# Patient Record
Sex: MALE | Race: WHITE | URBAN - METROPOLITAN AREA
[De-identification: names, ages, dates, MRNs, and addresses within clinical notes are randomized per-mention and may not be internally consistent; named-entity substitution may affect disease eponyms.]

---

## 2020-08-10 ENCOUNTER — TELEPHONE ENCOUNTER (OUTPATIENT)
Dept: URBAN - METROPOLITAN AREA CLINIC 40 | Facility: CLINIC | Age: 25
End: 2020-08-10

## 2020-08-12 ENCOUNTER — TELEPHONE ENCOUNTER (OUTPATIENT)
Dept: URBAN - METROPOLITAN AREA CLINIC 40 | Facility: CLINIC | Age: 25
End: 2020-08-12

## 2021-09-28 ENCOUNTER — OFFICE VISIT (OUTPATIENT)
Dept: URBAN - METROPOLITAN AREA TELEHEALTH 2 | Facility: TELEHEALTH | Age: 26
End: 2021-09-28
Payer: COMMERCIAL

## 2021-09-28 DIAGNOSIS — R10.84 ABDOMINAL CRAMPING, GENERALIZED: ICD-10-CM

## 2021-09-28 DIAGNOSIS — E55.9 VITAMIN D DEFICIENCY: ICD-10-CM

## 2021-09-28 DIAGNOSIS — K20.90 ESOPHAGITIS: ICD-10-CM

## 2021-09-28 DIAGNOSIS — M25.50 ARTHRALGIA, UNSPECIFIED JOINT: ICD-10-CM

## 2021-09-28 DIAGNOSIS — K50.80 CROHN'S ILEOCOLITIS: ICD-10-CM

## 2021-09-28 DIAGNOSIS — K29.80 DUODENITIS: ICD-10-CM

## 2021-09-28 DIAGNOSIS — K62.5 RECTAL BLEEDING: ICD-10-CM

## 2021-09-28 DIAGNOSIS — K29.70 GASTRITIS: ICD-10-CM

## 2021-09-28 PROCEDURE — 99213 OFFICE O/P EST LOW 20 MIN: CPT | Performed by: INTERNAL MEDICINE

## 2021-09-28 RX ORDER — ADALIMUMAB 40MG/0.4ML
1 SUBQ Q 5 DAYS KIT SUBCUTANEOUS
Qty: 18 | Refills: 0 | OUTPATIENT
Start: 2021-09-28 | End: 2021-12-26

## 2021-10-11 ENCOUNTER — TELEPHONE ENCOUNTER (OUTPATIENT)
Dept: URBAN - METROPOLITAN AREA CLINIC 40 | Facility: CLINIC | Age: 26
End: 2021-10-11

## 2021-10-12 ENCOUNTER — TELEPHONE ENCOUNTER (OUTPATIENT)
Dept: URBAN - METROPOLITAN AREA CLINIC 96 | Facility: CLINIC | Age: 26
End: 2021-10-12

## 2021-10-12 ENCOUNTER — TELEPHONE ENCOUNTER (OUTPATIENT)
Dept: URBAN - METROPOLITAN AREA CLINIC 23 | Facility: CLINIC | Age: 26
End: 2021-10-12

## 2021-10-12 RX ORDER — ADALIMUMAB 40MG/0.4ML
1 SUBQ Q 5 DAYS KIT SUBCUTANEOUS
Qty: 18 | Refills: 0
Start: 2021-09-28 | End: 2022-01-10

## 2021-12-02 ENCOUNTER — TELEPHONE ENCOUNTER (OUTPATIENT)
Dept: URBAN - METROPOLITAN AREA SURGERY CENTER 30 | Facility: SURGERY CENTER | Age: 26
End: 2021-12-02

## 2023-03-27 PROBLEM — 234347009: Status: ACTIVE | Noted: 2023-03-27

## 2023-04-12 ENCOUNTER — OFFICE VISIT (OUTPATIENT)
Dept: URBAN - METROPOLITAN AREA CLINIC 74 | Facility: CLINIC | Age: 28
End: 2023-04-12

## 2023-04-12 NOTE — HPI-TODAY'S VISIT:
The patient 7-year-old male with long history of Crohn's disease since age of 12-year-old well-known to Dr. Rodriguez is presenting to our clinic today for follow-up appointment.  The patient was last seen in the office in 2020.  At that time the patient was on Humira 40 mg subcu every other week with great response in remission.  Procedure: -- Colonoscopy with biopsy on 02/22/2018 by Dr. Rodriguez noted the entire examined colon is normal.  Erythematous, inflamed, and ulcerated mucosa in the terminal ileum.  The examination was otherwise normal.  Biopsy with focal active ileitis.

## 2023-04-12 NOTE — PHYSICAL EXAM NECK/THYROID:
normal appearance , without tenderness upon palpation , no deformities , trachea midline , Thyroid normal size , no masses , thyroid nontender yes

## 2024-01-11 ENCOUNTER — LAB OUTSIDE AN ENCOUNTER (OUTPATIENT)
Dept: URBAN - METROPOLITAN AREA CLINIC 74 | Facility: CLINIC | Age: 29
End: 2024-01-11

## 2024-01-11 ENCOUNTER — OFFICE VISIT (OUTPATIENT)
Dept: URBAN - METROPOLITAN AREA CLINIC 74 | Facility: CLINIC | Age: 29
End: 2024-01-11
Payer: COMMERCIAL

## 2024-01-11 VITALS
TEMPERATURE: 98.2 F | HEART RATE: 80 BPM | BODY MASS INDEX: 27.56 KG/M2 | SYSTOLIC BLOOD PRESSURE: 120 MMHG | WEIGHT: 175.6 LBS | HEIGHT: 67 IN | DIASTOLIC BLOOD PRESSURE: 78 MMHG

## 2024-01-11 DIAGNOSIS — E55.9 VITAMIN D DEFICIENCY: ICD-10-CM

## 2024-01-11 DIAGNOSIS — R68.89 SIGNS AND SYMPTOMS OF ANEMIA: ICD-10-CM

## 2024-01-11 DIAGNOSIS — D63.8 ANEMIA OF CHRONIC DISEASE: ICD-10-CM

## 2024-01-11 DIAGNOSIS — K50.80 CROHN'S ILEOCOLITIS: ICD-10-CM

## 2024-01-11 PROCEDURE — 99205 OFFICE O/P NEW HI 60 MIN: CPT | Performed by: PHYSICIAN ASSISTANT

## 2024-01-11 RX ORDER — DICYCLOMINE HYDROCHLORIDE 10 MG/1
1 CAPSULE 30 MINUTES BEFORE EATING CAPSULE ORAL THREE TIMES A DAY
Qty: 90 | Refills: 3 | OUTPATIENT
Start: 2024-01-11 | End: 2024-05-10

## 2024-01-11 RX ORDER — POLYETHYLENE GLYCOL 3350, SODIUM SULFATE, SODIUM CHLORIDE, POTASSIUM CHLORIDE, ASCORBIC ACID, SODIUM ASCORBATE 140-9-5.2G
AS DIRECTED KIT ORAL ONCE
Qty: 1 | Refills: 0 | OUTPATIENT
Start: 2024-01-11 | End: 2024-01-12

## 2024-01-11 NOTE — HPI-TODAY'S VISIT:
The patient is 28-year-old male with long history of Crohn's disease since age of 12-year-old known to Dr. Rodriguez is presenting to our clinic today for follow-up appointment.  The patient was last seen in the office in 2020.  At that time the patient was on Humira 40 mg SQ every other week with great response in 2020. The patient ahs been off tretament for three years due to lack of insurance. He was doing well on Humira. The patient complains of abdominal pain, loose watery stools X 2 per day. Bloody stools at least 2-3 times per month. GERD, upset stomach, and weakness. He has been controlling his symptoms with diet and exercises. He takes multivitamins daily. He has not gone to ED in the past three years but he has been symptomatic on and off. He also noted some weight loss within the last year.    -- The patient denies dysphagia, odynophagia, hemoptysis, hematemesis, vomiting, regurgitation, melena, constipation, fever, chills, chest pain, SOB, or any other GI complaints today.  -- The patient denies ETOH, Tobacco, and Illicit drug use except occasional Marijuana.   -- The patient is up to date with Flu and COVID vaccine.  -- Denies NSAID's.    Procedure: -- Colonoscopy with biopsy on 02/22/2018 by Dr. Rodriguez noted the entire examined colon is normal.  Erythematous, inflamed, and ulcerated mucosa in the terminal ileum.  The examination was otherwise normal.  Biopsy with focal active ileitis.

## 2024-01-11 NOTE — PHYSICAL EXAM GASTROINTESTINAL
Abdomen , soft, tender at LLQ, nondistended , no guarding or rigidity , no masses palpable , normal bowel sounds , Liver and Spleen , no hepatomegaly present , no hepatosplenomegaly , liver nontender , spleen not palpable

## 2024-01-17 ENCOUNTER — DASHBOARD ENCOUNTERS (OUTPATIENT)
Age: 29
End: 2024-01-17

## 2024-01-17 ENCOUNTER — TELEPHONE ENCOUNTER (OUTPATIENT)
Dept: URBAN - METROPOLITAN AREA CLINIC 74 | Facility: CLINIC | Age: 29
End: 2024-01-17

## 2024-01-17 LAB
% SATURATION: 11
A/G RATIO: 1.6
ABSOLUTE BASOPHILS: 30
ABSOLUTE EOSINOPHILS: 150
ABSOLUTE LYMPHOCYTES: 1995
ABSOLUTE MONOCYTES: 720
ABSOLUTE NEUTROPHILS: 4605
ADALIMUMAB AB, IBD: <10
ADALIMUMAB LEVEL, IBD: <0.8
ALBUMIN: 4.3
ALKALINE PHOSPHATASE: 80
ALT (SGPT): 16
AST (SGOT): 16
BASOPHILS: 0.4
BILIRUBIN, TOTAL: 1.3
BUN/CREATININE RATIO: (no result)
BUN: 11
C-REACTIVE PROTEIN, QUANT: 21.1
CALCIUM: 9.1
CALPROTECTIN, FECAL: (no result)
CARBON DIOXIDE, TOTAL: 25
CHLORIDE: 103
COMMENT: (no result)
CREATININE: 0.96
EGFR: 111
EOSINOPHILS: 2
FERRITIN: 38
FOLATE (FOLIC ACID), SERUM: 9.6
GLOBULIN, TOTAL: 2.7
GLUCOSE: 82
HBSAG SCREEN: (no result)
HEMATOCRIT: 41.8
HEMOGLOBIN: 13.9
HEP A AB, IGM: (no result)
HEP B CORE AB, IGM: (no result)
HEPATITIS C ANTIBODY: (no result)
INTERPRETATION: (no result)
IRON BINDING CAPACITY: 338
IRON, TOTAL: 37
LEUKOCYTES: (no result)
LYMPHOCYTES: 26.6
MCH: 28.1
MCHC: 33.3
MCV: 84.4
MONOCYTES: 9.6
MPV: 11.8
NEUTROPHILS: 61.4
PLATELET COUNT: 307
POTASSIUM: 4.2
PROTEIN, TOTAL: 7
QUANTIFERON-TB GOLD PLUS: (no result)
RDW: 12.4
RED BLOOD CELL COUNT: 4.95
SODIUM: 140
VITAMIN B12: 314
VITAMIN D,25-OH,TOTAL,IA: 32
WHITE BLOOD CELL COUNT: 7.5

## 2024-01-19 LAB
ADALIMUMAB LEVEL, IBD: <0.8
COMMENT: (no result)
INTERPRETATION: (no result)

## 2024-02-08 ENCOUNTER — COL/EGD (OUTPATIENT)
Dept: URBAN - METROPOLITAN AREA SURGERY CENTER 30 | Facility: SURGERY CENTER | Age: 29
End: 2024-02-08

## 2024-03-04 ENCOUNTER — COL/EGD (OUTPATIENT)
Dept: URBAN - METROPOLITAN AREA SURGERY CENTER 30 | Facility: SURGERY CENTER | Age: 29
End: 2024-03-04

## 2024-03-07 ENCOUNTER — OV EP (OUTPATIENT)
Dept: URBAN - METROPOLITAN AREA CLINIC 74 | Facility: CLINIC | Age: 29
End: 2024-03-07

## 2024-03-07 RX ORDER — DICYCLOMINE HYDROCHLORIDE 10 MG/1
1 CAPSULE 30 MINUTES BEFORE EATING CAPSULE ORAL THREE TIMES A DAY
Qty: 90 | Refills: 3 | Status: ACTIVE | COMMUNITY
Start: 2024-01-11 | End: 2024-05-10

## 2024-03-07 NOTE — HPI-TODAY'S VISIT:
The patient is 28-year-old male with long history of Crohn's disease since age of 12-year-old known to Dr. Posada is presenting to our clinic today to discuss his labs, stool study, imaging, EGD, and Colonoscopy results.    -- The patient denies dysphagia, odynophagia, hemoptysis, hematemesis, vomiting, regurgitation, melena, constipation, fever, chills, chest pain, SOB, or any other GI complaints today.  -- The patient denies ETOH, Tobacco, and Illicit drug use except occasional Marijuana.   -- The patient is up to date with Flu and COVID vaccine.  -- Denies NSAID's.    Diagnostic studies: -- Labs on 01/11/2024 Vitamin B12 314, Folic acid 9.6, CRP 21.1, Vitamin D 32, CMP with normal hepatic function, CBC with normal Hemoglobin and Hematocrit, Fe 37, TIBC 338,  % Sat.  11, Ferritin 38, Acute hepatitis panel negative, Adalimumab drug level and antibody undetectable, and Quantifereon is pending.    Procedure: -- Colonoscopy with biopsy on 02/22/2018 by Dr. Rodriguez noted the entire examined colon is normal.  Erythematous, inflamed, and ulcerated mucosa in the terminal ileum.  The examination was otherwise normal.  Biopsy with focal active ileitis. No LE edema. No calf tenderness.  No palpable cords.  2+ DP pulse bilaterally

## 2024-04-04 ENCOUNTER — OV EP (OUTPATIENT)
Dept: URBAN - METROPOLITAN AREA CLINIC 40 | Facility: CLINIC | Age: 29
End: 2024-04-04

## 2024-05-16 ENCOUNTER — TELEPHONE ENCOUNTER (OUTPATIENT)
Dept: URBAN - METROPOLITAN AREA CLINIC 74 | Facility: CLINIC | Age: 29
End: 2024-05-16

## 2024-05-17 ENCOUNTER — OFFICE VISIT (OUTPATIENT)
Dept: URBAN - METROPOLITAN AREA MEDICAL CENTER 33 | Facility: MEDICAL CENTER | Age: 29
End: 2024-05-17
Payer: COMMERCIAL

## 2024-05-17 DIAGNOSIS — K29.60 OTHER GASTRITIS WITHOUT BLEEDING: ICD-10-CM

## 2024-05-17 DIAGNOSIS — K50.00 CICATRIZING ENTEROCOLITIS: ICD-10-CM

## 2024-05-17 DIAGNOSIS — R11.0 AM NAUSEA: ICD-10-CM

## 2024-05-17 DIAGNOSIS — R10.13 ABDOMINAL DISCOMFORT, EPIGASTRIC: ICD-10-CM

## 2024-05-17 PROCEDURE — 43239 EGD BIOPSY SINGLE/MULTIPLE: CPT | Performed by: INTERNAL MEDICINE

## 2024-05-17 PROCEDURE — 45380 COLONOSCOPY AND BIOPSY: CPT | Performed by: INTERNAL MEDICINE

## 2024-06-19 ENCOUNTER — OFFICE VISIT (OUTPATIENT)
Dept: URBAN - METROPOLITAN AREA CLINIC 105 | Facility: CLINIC | Age: 29
End: 2024-06-19
Payer: COMMERCIAL

## 2024-06-19 VITALS
SYSTOLIC BLOOD PRESSURE: 113 MMHG | BODY MASS INDEX: 29.03 KG/M2 | HEART RATE: 73 BPM | DIASTOLIC BLOOD PRESSURE: 77 MMHG | WEIGHT: 185 LBS | HEIGHT: 67 IN | TEMPERATURE: 98.2 F

## 2024-06-19 DIAGNOSIS — K50.80 CROHN'S COLITIS: ICD-10-CM

## 2024-06-19 PROBLEM — 397172008: Status: ACTIVE | Noted: 2024-06-19

## 2024-06-19 PROCEDURE — 99214 OFFICE O/P EST MOD 30 MIN: CPT | Performed by: INTERNAL MEDICINE

## 2024-06-19 RX ORDER — ADALIMUMAB 40MG/0.4ML
1 PEN KIT SUBCUTANEOUS
Qty: 1 KIT | Refills: 5 | OUTPATIENT
Start: 2024-06-19 | End: 2024-12-15

## 2024-06-19 RX ORDER — ADALIMUMAB 80 MG-40MG
2 PENS KIT SUBCUTANEOUS
Qty: 1 KIT | Refills: 0 | OUTPATIENT
Start: 2024-06-19 | End: 2024-07-04

## 2024-06-19 NOTE — HPI-TODAY'S VISIT:
The patient is 28-year-old male with long history of Crohn's disease since age of 12-year-old known to Dr. Posada is presenting to our clinic today to discuss his labs, stool study, imaging, EGD, and Colonoscopy results.    -- The patient denies dysphagia, odynophagia, hemoptysis, hematemesis, vomiting, regurgitation, melena, constipation, fever, chills, chest pain, SOB, or any other GI complaints today.  -- The patient denies ETOH, Tobacco, and Illicit drug use except occasional Marijuana.   -- The patient is up to date with Flu and COVID vaccine.  -- Denies NSAID's.    Diagnostic studies: -- Labs on 01/11/2024 Vitamin B12 314, Folic acid 9.6, CRP 21.1, Vitamin D 32, CMP with normal hepatic function, CBC with normal Hemoglobin and Hematocrit, Fe 37, TIBC 338,  % Sat.  11, Ferritin 38, Acute hepatitis panel negative, Adalimumab drug level and antibody undetectable, and Quantifereon is pending.    Procedure: -- Colonoscopy with biopsy on 02/22/2018 by Dr. Rodriguez noted the entire examined colon is normal.  Erythematous, inflamed, and ulcerated mucosa in the terminal ileum.  The examination was otherwise normal.  Biopsy with focal active ileitis. - 6/19/2024: Humira from [id number]. lost insurance and had to stop it.While he was on this for all those years he was fairly stable.  My colonoscopy last month showed some stenosis of the terminal ileum and biopsies did show active ileitis

## 2024-06-20 LAB
HEPATITIS B CORE AB TOTAL: (no result)
HEPATITIS B SURFACE AB IMMUNITY, QN: <5
HEPATITIS B SURFACE ANTIGEN: (no result)
VITAMIN B12: 314
VITAMIN D,25-OH,TOTAL,IA: 36

## 2024-06-25 ENCOUNTER — TELEPHONE ENCOUNTER (OUTPATIENT)
Dept: URBAN - METROPOLITAN AREA CLINIC 6 | Facility: CLINIC | Age: 29
End: 2024-06-25

## 2025-02-06 ENCOUNTER — TELEPHONE ENCOUNTER (OUTPATIENT)
Dept: URBAN - METROPOLITAN AREA CLINIC 6 | Facility: CLINIC | Age: 30
End: 2025-02-06

## 2025-02-10 ENCOUNTER — TELEPHONE ENCOUNTER (OUTPATIENT)
Dept: URBAN - METROPOLITAN AREA CLINIC 105 | Facility: CLINIC | Age: 30
End: 2025-02-10

## 2025-02-10 ENCOUNTER — OFFICE VISIT (OUTPATIENT)
Dept: URBAN - METROPOLITAN AREA CLINIC 105 | Facility: CLINIC | Age: 30
End: 2025-02-10
Payer: COMMERCIAL

## 2025-02-10 VITALS
TEMPERATURE: 97.4 F | HEART RATE: 86 BPM | HEIGHT: 67 IN | DIASTOLIC BLOOD PRESSURE: 71 MMHG | BODY MASS INDEX: 29.35 KG/M2 | SYSTOLIC BLOOD PRESSURE: 117 MMHG | WEIGHT: 187 LBS

## 2025-02-10 DIAGNOSIS — K62.5 RECTAL BLEEDING: ICD-10-CM

## 2025-02-10 DIAGNOSIS — Z23 NEED FOR HEPATITIS B VACCINATION: ICD-10-CM

## 2025-02-10 DIAGNOSIS — R19.7 CHRONIC DIARRHEA: ICD-10-CM

## 2025-02-10 DIAGNOSIS — K50.00 CROHN'S DISEASE OF ILEUM WITHOUT COMPLICATION: ICD-10-CM

## 2025-02-10 DIAGNOSIS — R10.84 GENERALIZED ABDOMINAL PAIN: ICD-10-CM

## 2025-02-10 PROBLEM — 38106008: Status: ACTIVE | Noted: 2025-02-10

## 2025-02-10 PROCEDURE — 99214 OFFICE O/P EST MOD 30 MIN: CPT | Performed by: INTERNAL MEDICINE

## 2025-02-10 RX ORDER — ADALIMUMAB-ADAZ 80 MG/.8ML
160 MG ON DAY 1 AND 80 MG ON DAY 15 INJECTION, SOLUTION SUBCUTANEOUS AS DIRECTED
Qty: 2.4 ML | Refills: 0 | OUTPATIENT
Start: 2025-02-10 | End: 2025-02-25

## 2025-02-10 RX ORDER — ADALIMUMAB 80MG/0.8ML
AS DIRECTED KIT SUBCUTANEOUS
Status: ACTIVE | COMMUNITY

## 2025-02-10 RX ORDER — ADALIMUMAB-ADAZ 80 MG/.8ML
AS DIRECTED INJECTION, SOLUTION SUBCUTANEOUS
OUTPATIENT
Start: 2025-02-10

## 2025-02-10 RX ORDER — ADALIMUMAB-ADAZ 40 MG/.4ML
AS DIRECTED INJECTION, SOLUTION SUBCUTANEOUS
Qty: 2 | Refills: 11 | OUTPATIENT
Start: 2025-02-10 | End: 2026-01-12

## 2025-02-10 NOTE — HPI-TODAY'S VISIT:
The patient is 28-year-old male with long history of Crohn's disease since age of 12-year-old known to Dr. Posada is presenting to our clinic today to discuss his labs, stool study, imaging, EGD, and Colonoscopy results.    -- The patient denies dysphagia, odynophagia, hemoptysis, hematemesis, vomiting, regurgitation, melena, constipation, fever, chills, chest pain, SOB, or any other GI complaints today.  -- The patient denies ETOH, Tobacco, and Illicit drug use except occasional Marijuana.   -- The patient is up to date with Flu and COVID vaccine.  -- Denies NSAID's.   Diagnostic studies: -- Labs on 01/11/2024 Vitamin B12 314, Folic acid 9.6, CRP 21.1, Vitamin D 32, CMP with normal hepatic function, CBC with normal Hemoglobin and Hematocrit, Fe 37, TIBC 338,  % Sat.  11, Ferritin 38, Acute hepatitis panel negative, Adalimumab drug level and antibody undetectable, and Quantifereon is pending.   Procedure: -- Colonoscopy with biopsy on 02/22/2018 by Dr. Rodriguez noted the entire examined colon is normal.  Erythematous, inflamed, and ulcerated mucosa in the terminal ileum.  The examination was otherwise normal.  Biopsy with focal active ileitis. - 6/19/2024: Humira from [id number]. lost insurance and had to stop it.While he was on this for all those years he was fairly stable.  My colonoscopy last month showed some stenosis of the terminal ileum and biopsies did show active ileitis - 02/10/2025 Pt presents for refill on Humira. At last visit, he was restarted on Humira and labs ordered. His insurance is now requiring he try biosimilar for Humira (Hyrimoz). He reported being out of medication for the last 1.5 months. Today, pt states that his insurance company told him that they cover Hyrimoz, Rinvoq, Skyrizi, and Stelara. Pt states that Humira works well for him; only noted issue has been redness, swelling, and irritation at injection site. This has only been recently and resolves after a couple of hours. Otherwise he feels it has been working well for him. States that his health insurance is expensive.  States he has not been doing well since stopping the medicine. Has been having abdominal pain and nausea in the mornings. Less frequent and more painful BMs, but like diarrhea. Rectal bleeding about twice a week. Symptoms are better when he maintains a healthy diet.   Labs 6/19/24 - HBsAb <5; HBcAb negative, HBsAg negative. Vitamin D 36. Vitamin B12 314.

## 2025-02-11 ENCOUNTER — TELEPHONE ENCOUNTER (OUTPATIENT)
Dept: URBAN - METROPOLITAN AREA CLINIC 105 | Facility: CLINIC | Age: 30
End: 2025-02-11

## 2025-02-12 ENCOUNTER — TELEPHONE ENCOUNTER (OUTPATIENT)
Dept: URBAN - METROPOLITAN AREA CLINIC 105 | Facility: CLINIC | Age: 30
End: 2025-02-12

## 2025-02-13 ENCOUNTER — TELEPHONE ENCOUNTER (OUTPATIENT)
Dept: URBAN - METROPOLITAN AREA CLINIC 105 | Facility: CLINIC | Age: 30
End: 2025-02-13

## 2025-02-13 RX ORDER — ADALIMUMAB-ADAZ 80 MG/.8ML
160 MG ON DAY 1, THEN 80 MG ON DAY 15 INJECTION, SOLUTION SUBCUTANEOUS AS DIRECTED
Qty: 2.4 ML | Refills: 0
Start: 2025-02-10 | End: 2025-02-28

## 2025-02-13 RX ORDER — ADALIMUMAB-ADAZ 40 MG/.4ML
AS DIRECTED INJECTION, SOLUTION SUBCUTANEOUS
Qty: 2 | Refills: 11
Start: 2025-02-10 | End: 2026-01-15

## 2025-02-24 ENCOUNTER — TELEPHONE ENCOUNTER (OUTPATIENT)
Dept: URBAN - METROPOLITAN AREA CLINIC 105 | Facility: CLINIC | Age: 30
End: 2025-02-24

## 2025-02-25 ENCOUNTER — TELEPHONE ENCOUNTER (OUTPATIENT)
Dept: URBAN - METROPOLITAN AREA CLINIC 105 | Facility: CLINIC | Age: 30
End: 2025-02-25

## 2025-02-25 RX ORDER — ADALIMUMAB 80MG/0.8ML
160 MG ON DAY 1, THEN 80 MG ON DAY 15 KIT SUBCUTANEOUS AS DIRECTED
Qty: 1 KIT | Refills: 0 | OUTPATIENT
Start: 2025-02-26 | End: 2025-03-13

## 2025-02-25 RX ORDER — ADALIMUMAB 40MG/0.4ML
0.4 ML KIT SUBCUTANEOUS
Qty: 1 KIT | Refills: 11 | OUTPATIENT
Start: 2025-02-26 | End: 2026-01-28

## 2025-03-13 ENCOUNTER — TELEPHONE ENCOUNTER (OUTPATIENT)
Dept: URBAN - METROPOLITAN AREA CLINIC 17 | Facility: CLINIC | Age: 30
End: 2025-03-13

## 2025-03-24 ENCOUNTER — OFFICE VISIT (OUTPATIENT)
Dept: URBAN - METROPOLITAN AREA CLINIC 105 | Facility: CLINIC | Age: 30
End: 2025-03-24

## 2025-03-24 RX ORDER — ADALIMUMAB 40MG/0.4ML
0.4 ML KIT SUBCUTANEOUS
Qty: 1 KIT | Refills: 11 | COMMUNITY
Start: 2025-02-26 | End: 2026-01-28

## 2025-03-24 RX ORDER — ADALIMUMAB-ADAZ 40 MG/.4ML
AS DIRECTED INJECTION, SOLUTION SUBCUTANEOUS
Qty: 2 | Refills: 11 | COMMUNITY
Start: 2025-02-10 | End: 2026-01-15

## 2025-03-24 RX ORDER — ADALIMUMAB 80MG/0.8ML
AS DIRECTED KIT SUBCUTANEOUS
COMMUNITY

## 2025-03-27 ENCOUNTER — TELEPHONE ENCOUNTER (OUTPATIENT)
Dept: URBAN - METROPOLITAN AREA CLINIC 105 | Facility: CLINIC | Age: 30
End: 2025-03-27

## 2025-04-01 ENCOUNTER — TELEPHONE ENCOUNTER (OUTPATIENT)
Dept: URBAN - METROPOLITAN AREA CLINIC 105 | Facility: CLINIC | Age: 30
End: 2025-04-01

## 2025-04-02 ENCOUNTER — OFFICE VISIT (OUTPATIENT)
Dept: URBAN - METROPOLITAN AREA CLINIC 105 | Facility: CLINIC | Age: 30
End: 2025-04-02

## 2025-04-02 RX ORDER — ADALIMUMAB-ADAZ 40 MG/.4ML
AS DIRECTED INJECTION, SOLUTION SUBCUTANEOUS
Qty: 2 | Refills: 11 | COMMUNITY
Start: 2025-02-10 | End: 2026-01-15

## 2025-04-02 RX ORDER — ADALIMUMAB 80MG/0.8ML
AS DIRECTED KIT SUBCUTANEOUS
COMMUNITY

## 2025-04-02 RX ORDER — ADALIMUMAB 40MG/0.4ML
0.4 ML KIT SUBCUTANEOUS
Qty: 1 KIT | Refills: 11 | COMMUNITY
Start: 2025-02-26 | End: 2026-01-28

## 2025-04-02 NOTE — HPI-TODAY'S VISIT:
The patient is 28-year-old male with long history of Crohn's disease since age of 12-year-old known to Dr. Posada is presenting to our clinic today to discuss his labs, stool study, imaging, EGD, and Colonoscopy results.    -- The patient denies dysphagia, odynophagia, hemoptysis, hematemesis, vomiting, regurgitation, melena, constipation, fever, chills, chest pain, SOB, or any other GI complaints today.  -- The patient denies ETOH, Tobacco, and Illicit drug use except occasional Marijuana.   -- The patient is up to date with Flu and COVID vaccine.  -- Denies NSAID's.   Diagnostic studies: -- Labs on 01/11/2024 Vitamin B12 314, Folic acid 9.6, CRP 21.1, Vitamin D 32, CMP with normal hepatic function, CBC with normal Hemoglobin and Hematocrit, Fe 37, TIBC 338,  % Sat.  11, Ferritin 38, Acute hepatitis panel negative, Adalimumab drug level and antibody undetectable, and Quantifereon is pending.   Procedure: -- Colonoscopy with biopsy on 02/22/2018 by Dr. Rodriguez noted the entire examined colon is normal.  Erythematous, inflamed, and ulcerated mucosa in the terminal ileum.  The examination was otherwise normal.  Biopsy with focal active ileitis. - 6/19/2024: Humira from [id number]. lost insurance and had to stop it.While he was on this for all those years he was fairly stable.  My colonoscopy last month showed some stenosis of the terminal ileum and biopsies did show active ileitis - 02/10/2025 Pt presents for refill on Humira. At last visit, he was restarted on Humira and labs ordered. His insurance is now requiring he try biosimilar for Humira (Hyrimoz). He reported being out of medication for the last 1.5 months. Today, pt states that his insurance company told him that they cover Hyrimoz, Rinvoq, Skyrizi, and Stelara. Pt states that Humira works well for him; only noted issue has been redness, swelling, and irritation at injection site. This has only been recently and resolves after a couple of hours. Otherwise he feels it has been working well for him. States that his health insurance is expensive.  States he has not been doing well since stopping the medicine. Has been having abdominal pain and nausea in the mornings. Less frequent and more painful BMs, but like diarrhea. Rectal bleeding about twice a week. Symptoms are better when he maintains a healthy diet.  - 04/02/2025 Pt presents for f/u. At last visit, labs, H pylori breath test, and stool studies ordered, but none done. MRE ordered but not done. He declined prednisone for symptoms. He was encouraged to get hep B vaccine through PCP. Hyrimoz sent for him to restart; there have been issues with getting the medication d/t insurance/pharmacy going back and forth between requesting Hyrimoz vs. Humira.   Labs 6/19/24 - HBsAb <5; HBcAb negative, HBsAg negative. Vitamin D 36. Vitamin B12 314.  Sandra